# Patient Record
Sex: MALE | Race: WHITE | NOT HISPANIC OR LATINO | URBAN - METROPOLITAN AREA
[De-identification: names, ages, dates, MRNs, and addresses within clinical notes are randomized per-mention and may not be internally consistent; named-entity substitution may affect disease eponyms.]

---
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
## 2021-11-18 ENCOUNTER — HOSPITAL ENCOUNTER (OUTPATIENT)
Dept: BEHAVIORAL HEALTH | Facility: CLINIC | Age: 21
Discharge: HOME | End: 2021-11-18
Attending: FAMILY MEDICINE | Admitting: FAMILY MEDICINE
Payer: COMMERCIAL

## 2021-11-18 DIAGNOSIS — F48.9 MENTAL HEALTH PROBLEM: Primary | ICD-10-CM

## 2021-11-18 PROCEDURE — 90791 PSYCH DIAGNOSTIC EVALUATION: CPT | Mod: GT,95 | Performed by: COUNSELOR

## 2021-11-18 RX ORDER — ARIPIPRAZOLE 2 MG/1
TABLET ORAL
COMMUNITY
Start: 2021-11-05

## 2021-11-18 RX ORDER — FLUOXETINE 10 MG/1
CAPSULE ORAL
COMMUNITY
Start: 2021-10-25

## 2021-11-18 ASSESSMENT — COLUMBIA-SUICIDE SEVERITY RATING SCALE - C-SSRS
TOTAL  NUMBER OF ABORTED OR SELF INTERRUPTED ATTEMPTS PAST LIFETIME: NO
2. HAVE YOU ACTUALLY HAD ANY THOUGHTS OF KILLING YOURSELF LIFETIME?: NO
TOTAL  NUMBER OF INTERRUPTED ATTEMPTS PAST 3 MONTHS: NO
TOTAL  NUMBER OF INTERRUPTED ATTEMPTS LIFETIME: NO
TOTAL  NUMBER OF ABORTED OR SELF INTERRUPTED ATTEMPTS PAST 3 MONTHS: NO
5. HAVE YOU STARTED TO WORK OUT OR WORKED OUT THE DETAILS OF HOW TO KILL YOURSELF? DO YOU INTEND TO CARRY OUT THIS PLAN?: NO
ATTEMPT LIFETIME: NO
6. HAVE YOU EVER DONE ANYTHING, STARTED TO DO ANYTHING, OR PREPARED TO DO ANYTHING TO END YOUR LIFE?: NO
ATTEMPT PAST THREE MONTHS: NO
4. HAVE YOU HAD THESE THOUGHTS AND HAD SOME INTENTION OF ACTING ON THEM?: NO
6. HAVE YOU EVER DONE ANYTHING, STARTED TO DO ANYTHING, OR PREPARED TO DO ANYTHING TO END YOUR LIFE?: NO
1. IN THE PAST MONTH, HAVE YOU WISHED YOU WERE DEAD OR WISHED YOU COULD GO TO SLEEP AND NOT WAKE UP?: YES
4. HAVE YOU HAD THESE THOUGHTS AND HAD SOME INTENTION OF ACTING ON THEM?: NO
3. HAVE YOU BEEN THINKING ABOUT HOW YOU MIGHT KILL YOURSELF?: NO
2. HAVE YOU ACTUALLY HAD ANY THOUGHTS OF KILLING YOURSELF?: NO
5. HAVE YOU STARTED TO WORK OUT OR WORKED OUT THE DETAILS OF HOW TO KILL YOURSELF? DO YOU INTEND TO CARRY OUT THIS PLAN?: NO
1. IN THE PAST MONTH, HAVE YOU WISHED YOU WERE DEAD OR WISHED YOU COULD GO TO SLEEP AND NOT WAKE UP?: YES

## 2021-11-18 ASSESSMENT — ANXIETY QUESTIONNAIRES
2. NOT BEING ABLE TO STOP OR CONTROL WORRYING: SEVERAL DAYS
7. FEELING AFRAID AS IF SOMETHING AWFUL MIGHT HAPPEN: NEARLY EVERY DAY
IF YOU CHECKED OFF ANY PROBLEMS ON THIS QUESTIONNAIRE, HOW DIFFICULT HAVE THESE PROBLEMS MADE IT FOR YOU TO DO YOUR WORK, TAKE CARE OF THINGS AT HOME, OR GET ALONG WITH OTHER PEOPLE: SOMEWHAT DIFFICULT
6. BECOMING EASILY ANNOYED OR IRRITABLE: MORE THAN HALF THE DAYS
5. BEING SO RESTLESS THAT IT IS HARD TO SIT STILL: NOT AT ALL
3. WORRYING TOO MUCH ABOUT DIFFERENT THINGS: SEVERAL DAYS
1. FEELING NERVOUS, ANXIOUS, OR ON EDGE: SEVERAL DAYS
GAD7 TOTAL SCORE: 9

## 2021-11-18 ASSESSMENT — PATIENT HEALTH QUESTIONNAIRE - PHQ9: 5. POOR APPETITE OR OVEREATING: SEVERAL DAYS

## 2021-11-18 NOTE — PROGRESS NOTES
"[facility]  Provider Name:  Kathy Angel     Credentials:  [facility]    PATIENT'S NAME: Bev Kenny  PREFERRED NAME: Bev  PRONOUNS: he/him/his     MRN: [id number]  : 2000  ADDRESS: [address] [place] [id number]  ACCT. NUMBER:  [id number]  DATE OF SERVICE: 21  START TIME: 100  END TIME: 215  PREFERRED PHONE: [phone]  email:[email or web address]  May we leave a program related message: Yes  SERVICE MODALITY:  Video Visit:      Provider verified identity through the following two step process.  Patient provided:  Patient  and Patient address    Telemedicine Visit: The patient's condition can be safely assessed and treated via synchronous audio and visual telemedicine encounter.      Reason for Telemedicine Visit: Services only offered telehealth    Originating Site (Patient Location): Patient's home    Distant Site (Provider Location): Provider Remote Setting- Home Office    Consent:  The patient/guardian has verbally consented to: the potential risks and benefits of telemedicine (video visit) versus in person care; bill my insurance or make self-payment for services provided; and responsibility for payment of non-covered services.     Patient would like the video invitation sent by:  Text to cell phone: [phone]    Mode of Communication:  Video Conference via [employer]    As the provider I attest to compliance with applicable laws and regulations related to telemedicine.    [place] ADULT Mental Health DIAGNOSTIC ASSESSMENT    Identifying Information:  Patient is a 21 year old,   .  The pronoun use throughout this assessment reflects the patient's chosen pronoun.  Patient was referred for an assessment by Psychiatrist, Therapist .  Patient attended the session alone.    Chief Complaint:   The reason for seeking services at this time is: \" outpatient programming \"   The problem(s) began in . Patient has attempted to " "resolve these concerns in the past through therapy, medications, outpatient programs.    Social/Family History:  Patient reported they grew up in [place], [place].  They were raised by biological parents and grandmother .  Parents stayed .   Patient reported that their childhood was \"I have nothing to complain about\".  Patient described their current relationships with family of origin as \"pretty good\".      The patient describes their cultural background as \"[place] American - my family has strong connection\".  Cultural influences and impact on patient's life structure, values, norms, and healthcare: Patient denies.  Contextual influences on patient's health include:Individual Factors persistent depression .    These factors will be addressed in the Preliminary Treatment plan.  Patient identified their preferred language to be English. Patient reported they do not  need the assistance of an  or other support involved in therapy.     Patient reported had no significant delays in developmental tasks.   Patient's highest education level was some college. Patient identified the following learning problems: attention and concentration.  Modifications will not be used to assist communication in therapy.   Patient reports they are  able to understand written materials.    Patient reported the following relationship history: Patient reports \"nothing notable\".  Patient's current relationship status is single for 5 years.   Patient identified their sexual orientation as heterosexual.  Patient reported having zero child(yovana). Patient identified friends as part of their support system.  Patient identified the quality of these relationships as stable and meaningful.     Patient's current living/housing situation involves staying with grandmother.  They live with their grandmother and they report that housing is stable.     Patient is currently employed part time and reports they are able to function appropriately at " work..  Patient reports their finances are obtained through parents.  Patient does identify finances as a current stressor.      Patient reported that they have not been involved with the legal system.   Patient denies being on probation / parole / under the jurisdiction of the court.      Patient's Strengths and Limitations:  Patient identified the following strengths or resources that will help them succeed in treatment: commitment to health and well being, insight, positive school connection and work ethic. Things that may interfere with the patient's success in treatment include: none identified.     Personal and Family Medical History:   Patient does report a family history of mental health concerns.  Patient reports family history includes Anxiety Disorder in his father; Depression in his mother..     Patient does report Mental Health Diagnosis and/or Treatment.  Patient Patient reported the following previous diagnoses which include(s): ADHD and Depression.  Patient reported symptoms began in 2014.   Patient has received mental health services in the past: PHP in 2016, therapy, medications through PCP, 2019 IOP with [facility]. Psychiatric Hospitalizations: None.  Patient denies a history of civil commitment.  Patient is receiving other mental health services.  These include psychotherapy with Karime Santiago and psychiatry with [facility].  Next appointment: Scheduled.         Patient has had a physical exam to rule out medical causes for current symptoms.  Date of last physical exam was greater than a year ago and client was encouraged to schedule an exam with PCP. The patient does not have a Primary Care Provider and was encouraged to establish care with a PCP..  Patient reports the following current dental concerns: Recent wisdom teeth removal.  Patient denies any issues with pain..   There are not significant appetite / nutritional concerns / weight changes.   Patient does report a history of head  injury / trauma / cognitive impairment.  Patient reports that he is pretty sure that he has a concussion at one time.    Patient reports current meds as:   Outpatient Medications Marked as Taking for the 11/18/21 encounter ([facility]   Medication Sig     ARIPiprazole (ABILIFY) 2 MG tablet      FLUoxetine (PROZAC) 10 MG capsule        Medication Adherence:  Patient reports taking prescribed medications as prescribed.    Patient Allergies:  No Known Allergies    Medical History:  History reviewed. No pertinent past medical history.      Current Mental Status Exam:   Appearance:  Appropriate    Eye Contact:  Good   Psychomotor:  Normal       Gait / station:  no problem  Attitude / Demeanor: Cooperative  Interested  Speech      Rate / Production: Normal/ Responsive      Volume:  Normal  volume      Language:  intact  Mood:   Normal  Affect:   Flat    Thought Content: Clear   Thought Process: Blocking       Associations: No loosening of associations  Insight:   Fair   Judgment:  Intact   Orientation:  All  Attention/concentration: Good    Rating Scales:    PHQ9:    PHQ-9 SCORE 11/18/2021   PHQ-9 Total Score 22   ;    GAD7:    JOHANA-7 SCORE 11/18/2021   Total Score 9     CGI:     First:Considering your total clinical experience with this particular patient population, how severe are the patient's symptoms at this time?: 5 (11/18/2021  1:14 PM)  ;    Most recentCompared to the patient's condition at the START of treatment, this patient's condition is: 4 (11/18/2021  1:14 PM)      Substance Use:  Patient did report a family history of substance use concerns; see medical history section for details.  Patient has not received chemical dependency treatment in the past.  Patient has not ever been to detox.      Patient is not currently receiving any chemical dependency treatment. Patient reported the following problems as a result of their substance use:  Patient denies.    Patient reports  "using alcohol 4 times per week and has varies varies at a time. Patient first started drinking at age 18.  Patient reported date of last use was 11/17/21.  Patient reports heaviest use is current use.  Patient reports using tobacco 10 times per day. Client started using tobacco at age 18..  Patient reports using cannabis 1 times per day and smokes 1 at a time. Patient started using cannabis at age 18.  Patient reports last use was 18.  Patient reports heaviest use is current use.  Patient denies using caffeine.  Patient reports using/abusing the following substance(s). Patient reported no other substance use.     CAGE- AID:    CAGE-AID Total Score 11/18/2021   Total Score 0       Substance Use: No symptoms    Based on the negative CAGE score and clinical interview there  are not indications of drug or alcohol abuse.    Significant Losses / Trauma / Abuse / Neglect Issues:   Patient   did not serve in the .  There are indications or report of significant loss, trauma, abuse or neglect issues related to: are no indications and client denies any losses, trauma, abuse, or neglect concerns.  Concerns for possible neglect are not present.     Safety Assessment: Patient reports thoughts in the morning and in the evening and \"I wish that I didn't have to exist for this\" that are intrusive.  Patient reports that it takes a little bit of work to move on from them.  Patient denies plan and intent.  Patient denies history of suicide of attempts.  Current Safety Concerns:  [place] Suicide Severity Rating Scale (Lifetime/Recent)  [place] Suicide Severity Rating (Lifetime/Recent) 11/18/2021   1. Wish to be Dead (Lifetime) Yes   1. Wish to be Dead (Recent) Yes   2. Non-Specific Active Suicidal Thoughts (Lifetime) No   2. Non-Specific Active Suicidal Thoughts (Recent) No   3. Active Suicidal Ideation with any Methods (Not Plan) Without Intent to Act (Lifetime) No   3. Active Suicidal Ideation with any Methods (Not Plan) " Without Intent to Act (Recent) No   4. Active Suicidal Ideation with Some Intent to Act, Without Specific Plan (Lifetime) No   4. Active Suicidal Ideation with Some Intent to Act, Without Specific Plan (Recent) No   5. Active Suicidal Ideation with Specific Plan and Intent (Lifetime) No   5. Active Suicidal Ideation with Specific Plan and Intent (Recent) No   Actual Attempt (Lifetime) No   Actual Attempt (Past 3 Months) No   Has subject engaged in non-suicidal self-injurious behavior? (Lifetime) No   Has subject engaged in non-suicidal self-injurious behavior? (Past 3 Months) No   Interrupted Attempts (Lifetime) No   Interrupted Attempts (Past 3 Months) No   Aborted or Self-Interrupted Attempt (Lifetime) No   Aborted or Self-Interrupted Attempt (Past 3 Months) No   Preparatory Acts or Behavior (Lifetime) No   Preparatory Acts or Behavior (Past 3 Months) No     Patient denies current homicidal ideation and behaviors.  Patient denies current self-injurious ideation and behaviors.    Patient denied risk behaviors associated with substance use.  Patient denies any high risk behaviors associated with mental health symptoms.  Patient reports the following current concerns for their personal safety: None.  Patient reports there   are not firearms in the house.    Patient denies having access to firearms.    History of Safety Concerns:  Patient denied a history of homicidal ideation.     Patient denied a history of personal safety concerns.    Patient denied a history of assaultive behaviors.    Patient denied a history of sexual assault behaviors.     Patient denied a history of risk behaviors associated with substance use.  Patient denies any history of high risk behaviors associated with mental health symptoms.  Patient reports the following protective factors:  future focused thinking    Risk Plan:  See Recommendations for Safety and Risk Management Plan    Review of Symptoms per patient report:  Depression: Change in  sleep, Lack of interest, Excessive or inappropriate guilt, Change in energy level, Difficulties concentrating, Change in appetite, Suicidal ideation, Feelings of hopelessness, Ruminations, Irritability, Feeling sad, down, or depressed and Withdrawn  Hina:  No Symptoms  Psychosis: No Symptoms  Anxiety: Excessive worry, Nervousness, Physical complaints, such as headaches, stomachaches, muscle tension, Sleep disturbance, Ruminations, Poor concentration and Irritability  Panic:  No symptoms  Post Traumatic Stress Disorder:  No Symptoms   Eating Disorder: No Symptoms  ADD / ADHD:  Inattentive, Difficulties listening and Poor task completion  Conduct Disorder: No symptoms  Autism Spectrum Disorder: No symptoms  Obsessive Compulsive Disorder: Cleaning    Patient reports the following compulsive behaviors and treatment history: Picking - has not had treatment..      Diagnostic Criteria:   Generalized Anxiety Disorder  A. Excessive anxiety and worry about a number of events or activities (such as work or school performance).   B. The person finds it difficult to control the worry.  C. Select 3 or more symptoms (required for diagnosis). Only one item is required in children.   - Restlessness or feeling keyed up or on edge.    - Being easily fatigued.    - Difficulty concentrating or mind going blank.    - Irritability.    - Muscle tension.    - Sleep disturbance (difficulty falling or staying asleep, or restless unsatisfying sleep).   D. The focus of the anxiety and worry is not confined to features of an Axis I disorder.  E. The anxiety, worry, or physical symptoms cause clinically significant distress or impairment in social, occupational, or other important areas of functioning.   F. The disturbance is not due to the direct physiological effects of a substance (e.g., a drug of abuse, a medication) or a general medical condition (e.g., hyperthyroidism) and does not occur exclusively during a Mood Disorder, a Psychotic  Disorder, or a Pervasive Developmental Disorder. Major Depressive Disorder  A) Recurrent episode(s) - symptoms have been present during the same 2-week period and represent a change from previous functioning 5 or more symptoms (required for diagnosis)   - Depressed mood. Note: In children and adolescents, can be irritable mood.     - Diminished interest or pleasure in all, or almost all, activities.    - Fatigue or loss of energy.    - Feelings of worthlessness or inappropriate guilt.    - Diminished ability to think or concentrate, or indecisiveness.    - Recurrent thoughts of death (not just fear of dying), recurrent suicidal ideation without a specific plan, or a suicide attempt or a specific plan for committing suicide.   B) The symptoms cause clinically significant distress or impairment in social, occupational, or other important areas of functioning  C) The episode is not attributable to the physiological effects of a substance or to another medical condition  D) The occurence of major depressive episode is not better explained by other thought / psychotic disorders  E) There has never been a manic episode or hypomanic episode    Functional Status:  Patient reports the following functional impairments: relationship(s), self-care and social interactions.     WHODAS:   WHODAS 2.0 Total Score 11/18/2021   Total Score 21     Programmatic care:  Current LOCUS was assessed and patient needs the following level of care based on score 18  .    Clinical Summary:  1. Reason for assessment: to determine level of care  .  2. Psychosocial, Cultural and Contextual Factors: Persistent depression .  3. Principal DSM5 Diagnoses  (Sustained by DSM5 Criteria Listed Above):   296.33 (F33.2) Major Depressive Disorder, Recurrent Episode, Severe _ and With anxious distress.  4. Other Diagnoses that is relevant to services:   300.02 (F41.1) Generalized Anxiety Disorder.  5. Provisional Diagnosis:  Further diagnosis clarification may  be beneficial.  6. Prognosis: Relieve Acute Symptoms.  7. Likely consequences of symptoms if not treated: Higher level of care.  8. Client strengths include:  creative, educated, employed, goal-focused, good listener and has a previous history of therapy .     Recommendations:     1. Plan for Safety and Risk Management:   A safety and risk management plan has been developed including: Patient consented to co-developed safety plan.  Safety and risk management plan was completed.  Patient agreed to use safety plan should any safety concerns arise.  A copy was given to the patient..          Report to child / adult protection services was NA.     2. Patient's identified none identified.     3. Initial Treatment will focus on:    Depressed Mood   Anxiety      4. Resources/Service Plan:    services are not indicated.   Modifications to assist communication are not indicated.   Additional disability accommodations are not indicated.      5. Collaboration:   Collaboration / coordination of treatment will be initiated with the following  support professionals: outpatient therapist and psychiatry.      6.  Referrals:   The following referral(s) will be initiated: Day Treatment. Next Scheduled Appointment: Patient will contact  regarding start date in programming due to school schedule.     A Release of Information has been obtained for the following: Emergency Contact.    7. FABY:    FABY:  Discussed the general effects of drugs and alcohol on health and well-being. Provider gave patient printed information about the effects of chemical use on their health and well being. Recommendations:  To abstain from all mood altering substances .     8. Records:   These were reviewed at time of assessment.   Information in this assessment was obtained from the medical record and  provided by patient who is a good historian.    Patient will have open access to their mental health medical record.        Provider Name/  "Credentials:  Kathy Angel [facility]  2021      LOCUS Worksheet     Name: Graham Kenny MRN: [id number]    : 2000      Gender:  male    PMI:  SRINIVASAN   Provider NPI: [id number] Provider Name: CRISS Luu    Actual level of Care Provided:  Therapy, psychiatry    Service(s) receiving or referred to:  Adult Day Treatment    Reason for Variance: Adult Day Treatment      Rating completed by: Kathy Angel [facility]      I. Risk of Harm:   2      Low Risk of Harm    II. Functional Status:   3      Moderate Impairment    III. Co-Morbidity:   2      Minor Co-Morbidity    IV - A. Recovery Environment - Level of Stress:   3      Moderately Stress Environment    IV - B. Recovery Environment - Level of Support:   3      Limited Support in Environment    V. Treatment and Recovery History:   3      Moderate to Equivocal Response to Treatment and Recovery Management    VI. Engagement and Recovery Project:   2      Positive Engagement and Recovery       18 Composite Score    Level of Care Recommendation:   17 to 19       High Intensity Community Based Services                  Outpatient Mental Health Services - Adult    MY COPING PLAN FOR SAFETY    PATIENT'S NAME: Graham Kenny  MRN:   [id number]    SAFETY PLAN:    Step 1: Warning signs / cues (Thoughts, images, mood, situation, behavior) that a crisis may be developing:      Thoughts: \"I can't do this anymore\", \"I just want this to end\" and \"Nothing makes it better\"    Mood: worsening depression and hopelessness    Situations: waking much later and when there is no one to talk to     Step 2: Coping strategies - Things I can do to take my mind off of my problems without contacting another person (relaxation technique, physical activity):      Distress Tolerance Strategies: watch a video, look onto feed    Step 3: People and social settings that provide distraction:     Any friends that are available.    Step 5: When I am in crisis, I can ask these people " to help me use my safety plan:     Any friends that are available.    Step 6: Making the environment safe:       be around others    Step 7: Professionals or agencies I can contact during a crisis:      Suicide Prevention Lifeline: 2-356-747-GOFV (6397)    Crisis Text Line Service (available 24 hours a day, 7 days a week): Text [place] to 667545    Call  **CRISIS (570136) from a cell phone to talk to a team of professionals who can help you.    Crisis Services By [facility]: Phone Number:   [place]     [phone]   [place]    [phone]   [place]    [phone]   [place]    [phone]   [place]    [phone]   [place] [phone]   [place]     [phone]       Call 911 or go to my nearest emergency department.     I helped develop this safety plan and agree to use it when needed.  I have been given a copy of this plan.      Client signature _________________________________________________________________  Today s date:  11/18/2021  Adapted from Safety Plan Template 2008 Maura Macario and Olu Newell is reprinted with the express permission of the authors.  No portion of the Safety Plan Template may be reproduced without the express, written permission.  You can contact the authors at [email or web address] or [email or web address]

## 2021-11-19 ASSESSMENT — PATIENT HEALTH QUESTIONNAIRE - PHQ9: SUM OF ALL RESPONSES TO PHQ QUESTIONS 1-9: 22

## 2021-11-19 ASSESSMENT — ANXIETY QUESTIONNAIRES: GAD7 TOTAL SCORE: 9

## 2022-02-06 ENCOUNTER — HEALTH MAINTENANCE LETTER (OUTPATIENT)
Age: 22
End: 2022-02-06

## 2022-10-02 ENCOUNTER — HEALTH MAINTENANCE LETTER (OUTPATIENT)
Age: 22
End: 2022-10-02

## 2023-02-11 ENCOUNTER — HEALTH MAINTENANCE LETTER (OUTPATIENT)
Age: 23
End: 2023-02-11

## 2024-03-09 ENCOUNTER — HEALTH MAINTENANCE LETTER (OUTPATIENT)
Age: 24
End: 2024-03-09

## 2024-12-17 ENCOUNTER — HOSPITAL ENCOUNTER (EMERGENCY)
Facility: CLINIC | Age: 24
Discharge: LEFT BEFORE DISCHARGE | End: 2024-12-17
Attending: EMERGENCY MEDICINE | Admitting: EMERGENCY MEDICINE
Payer: COMMERCIAL

## 2024-12-17 VITALS
HEART RATE: 120 BPM | DIASTOLIC BLOOD PRESSURE: 81 MMHG | SYSTOLIC BLOOD PRESSURE: 116 MMHG | WEIGHT: 250 LBS | TEMPERATURE: 99.4 F | RESPIRATION RATE: 16 BRPM | OXYGEN SATURATION: 97 %

## 2024-12-17 DIAGNOSIS — Z53.21 PATIENT LEFT WITHOUT BEING SEEN: ICD-10-CM

## 2024-12-17 PROCEDURE — 99281 EMR DPT VST MAYX REQ PHY/QHP: CPT

## 2024-12-17 ASSESSMENT — COLUMBIA-SUICIDE SEVERITY RATING SCALE - C-SSRS
2. HAVE YOU ACTUALLY HAD ANY THOUGHTS OF KILLING YOURSELF IN THE PAST MONTH?: NO
6. HAVE YOU EVER DONE ANYTHING, STARTED TO DO ANYTHING, OR PREPARED TO DO ANYTHING TO END YOUR LIFE?: NO
1. IN THE PAST MONTH, HAVE YOU WISHED YOU WERE DEAD OR WISHED YOU COULD GO TO SLEEP AND NOT WAKE UP?: NO

## 2024-12-18 NOTE — ED PROVIDER NOTES
LEFT WITHOUT BEING SEEN    The pt left triage before being seen. I did not evaluate this patient.    MD Abrahan Rich Oliver, MD  12/17/24 6547

## 2024-12-18 NOTE — ED TRIAGE NOTES
Pt was seen an 1 hour a ago for anus abscess, but they couldnt help him as it was deeper tissue, so he is here for assessment and treatment.     JAMES MCGREGOR RN       Triage Assessment (Adult)       Row Name 12/17/24 5717          Triage Assessment    Airway WDL WDL        Respiratory WDL    Respiratory WDL WDL        Skin Circulation/Temperature WDL    Skin Circulation/Temperature WDL X        Cardiac WDL    Cardiac WDL WDL        Peripheral/Neurovascular WDL    Peripheral Neurovascular WDL WDL        Cognitive/Neuro/Behavioral WDL    Cognitive/Neuro/Behavioral WDL WDL

## 2025-03-16 ENCOUNTER — HEALTH MAINTENANCE LETTER (OUTPATIENT)
Age: 25
End: 2025-03-16